# Patient Record
Sex: MALE | Race: WHITE | NOT HISPANIC OR LATINO | URBAN - METROPOLITAN AREA
[De-identification: names, ages, dates, MRNs, and addresses within clinical notes are randomized per-mention and may not be internally consistent; named-entity substitution may affect disease eponyms.]

---

## 2018-11-06 DIAGNOSIS — N20.0 KIDNEY STONES: Primary | ICD-10-CM

## 2018-11-08 ENCOUNTER — TELEPHONE (OUTPATIENT)
Dept: UROLOGY | Facility: MEDICAL CENTER | Age: 53
End: 2018-11-08

## 2018-11-08 NOTE — TELEPHONE ENCOUNTER
[facility] Pharmacist calling to refill pt's Allopurinol 100 mg, please advise    [id number] [facility]

## 2018-11-08 NOTE — TELEPHONE ENCOUNTER
Patient needs to have a follow up appointment with our office before medication can be filled  He has not been seen since 2013 by Asrh Salazar, STEPHANI PAPPAS  I called patient and left a message informing him of this

## 2018-11-14 RX ORDER — ALLOPURINOL 100 MG/1
TABLET ORAL
Qty: 90 TABLET | Refills: 3 | Status: SHIPPED | OUTPATIENT
Start: 2018-11-14 | End: 2019-07-15 | Stop reason: SDUPTHER

## 2019-02-22 ENCOUNTER — TELEPHONE (OUTPATIENT)
Dept: UROLOGY | Facility: MEDICAL CENTER | Age: 54
End: 2019-02-22

## 2019-02-22 NOTE — TELEPHONE ENCOUNTER
Reason for appointment/Complaint/Diagnosis : ED    Insurance: CBC    History of Cancer? No                    If yes, what kind? N/A    Previous urologist?     DR RODRIGUEZ 2013                  Records requested/where? No    Outside testing/where? no    Location Preference for office visit?  [place]

## 2019-02-26 ENCOUNTER — OFFICE VISIT (OUTPATIENT)
Dept: UROLOGY | Facility: CLINIC | Age: 54
End: 2019-02-26

## 2019-02-26 VITALS
BODY MASS INDEX: 34.07 KG/M2 | HEART RATE: 83 BPM | WEIGHT: 238 LBS | DIASTOLIC BLOOD PRESSURE: 78 MMHG | HEIGHT: 70 IN | SYSTOLIC BLOOD PRESSURE: 130 MMHG

## 2019-02-26 DIAGNOSIS — N52.9 ERECTILE DYSFUNCTION, UNSPECIFIED ERECTILE DYSFUNCTION TYPE: Primary | ICD-10-CM

## 2019-02-26 RX ORDER — TADALAFIL 20 MG/1
20 TABLET ORAL DAILY PRN
Qty: 10 TABLET | Refills: 0 | Status: SHIPPED | OUTPATIENT
Start: 2019-02-26

## 2019-02-26 RX ORDER — FLUTICASONE PROPIONATE 50 MCG
SPRAY, SUSPENSION (ML) NASAL AS NEEDED
COMMUNITY
Start: 2015-02-05

## 2019-02-26 RX ORDER — LISINOPRIL AND HYDROCHLOROTHIAZIDE 12.5; 1 MG/1; MG/1
TABLET ORAL
COMMUNITY
Start: 2019-01-28

## 2019-02-26 RX ORDER — SIMVASTATIN 20 MG
TABLET ORAL
Refills: 1 | COMMUNITY
Start: 2018-12-10

## 2019-02-26 RX ORDER — ESCITALOPRAM OXALATE 20 MG/1
20 TABLET ORAL DAILY
Refills: 1 | COMMUNITY
Start: 2019-01-25

## 2019-02-26 RX ORDER — MULTIVITAMIN
1 CAPSULE ORAL DAILY
COMMUNITY

## 2019-02-26 NOTE — PROGRESS NOTES
UROLOGY   NEW PATIENT NOTE     Patient Identifiers: Robin Saucedo (MRN: [id number])    Service Providing Consultation:  Urology, Angus Flores PA-C  Consults  Date of Service: 2/26/2019      History of Present Illness:     Robin Saucedo is a 48 y o  Male not seen since 2003 with history kidney stones and previous vasectomy  He comes in with concerns regarding new onset of erectile dysfunction  His medical history includes dyslipidemia,  obesity and hypertension  He has a steady girlfriend and has noticed a problem for about the last 6-12 months  He was given Viagra by his family physician  He feels it was not very helpful and he developed a considerable headache about 3 hours later  He does not smoke or use much alcohol  His fasting blood sugars was  106 recently  He has never been tested for sleep apnea  Past Medical, Past Surgical History:   No past medical history on file :    No past surgical history on file :    Medications, Allergies:     Current Outpatient Medications:     allopurinol (ZYLOPRIM) 100 mg tablet, TAKE 1 TABLET BY MOUTH EVERY DAY, Disp: 90 tablet, Rfl: 3    aspirin 81 MG tablet, Take 81 mg by mouth, Disp: , Rfl:     DOCOSAHEXAENOIC ACID PO, Take 1,200 mg by mouth, Disp: , Rfl:     escitalopram (LEXAPRO) 20 mg tablet, Take 20 mg by mouth daily, Disp: , Rfl: 1    fluticasone (FLONASE) 50 mcg/act nasal spray, as needed, Disp: , Rfl:     lisinopril-hydrochlorothiazide (PRINZIDE,ZESTORETIC) 10-12 5 MG per tablet, lisinopril 10 mg-hydrochlorothiazide 12 5 mg tablet, Disp: , Rfl:     Multiple Vitamin (MULTIVITAMIN) capsule, Take 1 capsule by mouth daily, Disp: , Rfl:     simvastatin (ZOCOR) 20 mg tablet, TAKE 1 TABLET (20 MG TOTAL) BY MOUTH EVERY EVENING , Disp: , Rfl: 1    tadalafil (CIALIS) 20 MG tablet, Take 1 tablet (20 mg total) by mouth daily as needed for erectile dysfunction, Disp: 10 tablet, Rfl: 0    Allergies:   Allergies   Allergen Reactions    Penicillins Other (See Comments)     Was told since age 3 he was allergic  Doesn't recall reactions   Fenofibrate Swelling     Used for High Cholesterol, but created muscle pain in his arms   :    Social and Family History:   Social History:   Social History     Tobacco Use    Smoking status: Not on file   Substance Use Topics    Alcohol use: Not on file    Drug use: Not on file     Social History     Tobacco Use   Smoking Status Not on file       Family History:  No family history on file :     Review of Systems:     General: Fever, chills, or night sweats: negative  Cardiac: Negative for chest pain  Pulmonary: Negative for shortness of breath  Gastrointestinal: Abdominal pain negative  Nausea, vomiting, or diarrhea negative,  Genitourinary: See HPI above  Patient does not have hematuria  All other systems queried were negative  Physical Exam:   General: Patient is pleasant and in NAD  Awake and alert  /78 (BP Location: Left arm, Patient Position: Sitting, Cuff Size: Standard)   Pulse 83   Ht 5' 10" (1 778 m)   Wt 108 kg (238 lb)   BMI 34 15 kg/m²   Cardiac: Peripheral edema: negative  Pulmonary: Non-labored breathing  Abdomen: Soft, non-tender, non-distended  No surgical scars  No masses, tenderness, hernias noted  Genitourinary: Negative CVA tenderness, negative suprapubic tenderness  (Male): Penis circumcised, phallus normal, meatus patent  Testicles descended into scrotum bilaterally without masses nor tenderness  No inguinal hernias bilaterally  Labs:   No results found for: HGB, HCT, WBC, PLT]    No results found for: NA, K, CL, CO2, BUN, CREATININE, CALCIUM, GLUCOSE]    Imaging:   I personally reviewed the images and report of the following studies, and reviewed them with the patient:    None      ASSESSMENT:      1  Erectile dysfunction   2   BPH   3  Kidney stones      PLAN:    We talked about the usage of Viagra on an empty stomach   I gave him a prescription for Cialis 20 mg   he will get blood work including testosterone, LH, [address], PSA and hemoglobin A1c   will follow up in 3 months   he will call with any questions or concerns and I will speak to him with his blood results    Thank you for allowing me to participate in this patients care  Please do not hesitate to call with any additional questions    Sonido Mujica PA-C

## 2019-03-14 LAB — HBA1C MFR BLD HPLC: 6 %

## 2019-06-04 ENCOUNTER — TELEPHONE (OUTPATIENT)
Dept: UROLOGY | Facility: CLINIC | Age: 54
End: 2019-06-04

## 2019-07-15 DIAGNOSIS — N20.0 KIDNEY STONES: ICD-10-CM

## 2019-07-15 RX ORDER — ALLOPURINOL 100 MG/1
100 TABLET ORAL DAILY
Qty: 90 TABLET | Refills: 3 | Status: SHIPPED | OUTPATIENT
Start: 2019-07-15

## 2019-07-15 NOTE — TELEPHONE ENCOUNTER
Bernardino from [facility] on [address]  [address]  Called to say patient is requesting home delivery for medication Allopurinol 100 mg  It is currently being sent to [facility]   Requesting this change for 90 day supply